# Patient Record
Sex: FEMALE | Race: WHITE | NOT HISPANIC OR LATINO | Employment: FULL TIME | URBAN - METROPOLITAN AREA
[De-identification: names, ages, dates, MRNs, and addresses within clinical notes are randomized per-mention and may not be internally consistent; named-entity substitution may affect disease eponyms.]

---

## 2017-09-25 ENCOUNTER — OFFICE VISIT (OUTPATIENT)
Dept: URGENT CARE | Facility: RETAIL CLINIC | Age: 27
End: 2017-09-25
Payer: COMMERCIAL

## 2017-09-25 VITALS — DIASTOLIC BLOOD PRESSURE: 79 MMHG | SYSTOLIC BLOOD PRESSURE: 130 MMHG | HEART RATE: 80 BPM | TEMPERATURE: 97.7 F

## 2017-09-25 DIAGNOSIS — H10.33 ACUTE BACTERIAL CONJUNCTIVITIS OF BOTH EYES: Primary | ICD-10-CM

## 2017-09-25 PROCEDURE — 99213 OFFICE O/P EST LOW 20 MIN: CPT | Performed by: PHYSICIAN ASSISTANT

## 2017-09-25 RX ORDER — CIPROFLOXACIN HYDROCHLORIDE 3.5 MG/ML
2 SOLUTION/ DROPS TOPICAL 3 TIMES DAILY
Qty: 1 BOTTLE | Refills: 0 | Status: SHIPPED | OUTPATIENT
Start: 2017-09-25 | End: 2017-10-02

## 2017-09-25 RX ORDER — DESOGESTREL AND ETHINYL ESTRADIOL 0.15-0.03
KIT ORAL
Refills: 3 | COMMUNITY
Start: 2017-07-19

## 2017-09-25 NOTE — PATIENT INSTRUCTIONS
Use eye drops as directed for 7 days   Out of activities for at least 24 hrs due to being contagious.   Handwashing     items touched   Throw out mascara  Wear Glasses this week if possible  Toss contacts lens  Sterile lens case  Paper towels at all sinks at home  Wipe discharge away with wet paper towel.   Please follow up with primary care provider if not improving, worsening or new symptoms or for any adverse reactions to medications.

## 2017-09-25 NOTE — PROGRESS NOTES
Chief Complaint   Patient presents with     Eye Problem     1 day; Left first, then right; watery, redness, pain and swelling under both eyes      SUBJECTIVE:  Mónica Lindsey is a 26 year old female who presents complaining of moderate both eyes (L, then R eye) redness, discharge started yesterday  Onset/timing: gradual, worsening.    Associated Signs and Symptoms: cold sxs improving, started last week  No light sensitivity, eye pain, FB sensation.  Treatment measures tried include: none  Contact wearer : Yes     No past medical history on file.  Current Outpatient Prescriptions   Medication Sig Dispense Refill     APRI 0.15-30 MG-MCG per tablet   3        Allergies   Allergen Reactions     Bees      Epipen necessary     Aleve [Naproxen]         History   Smoking Status     Current Every Day Smoker     Packs/day: 0.50     Years: 5.00   Smokeless Tobacco     Never Used       ROS:  CONSTITUTIONAL:NEGATIVE for fever, chills  EYES: POSITIVE for discharge, redness and NEGATIVE for eye pain and photophobia  ENT/MOUTH: POSITIVE for congestion improving NEGATIVE for ear pain    OBJECTIVE:  /79 (BP Location: Left arm)  Pulse 80  Temp 97.7  F (36.5  C) (Oral)  General: no acute distress  Eye exam: PERRL, EOMs intact, fundoscopic exam benign, right eye abnormal findings: conjunctivitis with erythema, discharge and matting noted, left eye abnormal findings: conjunctivitis with erythema, discharge and matting noted.    ASSESSMENT:  (H10.33) Acute bacterial conjunctivitis of both eyes     PLAN:  Plan: ciprofloxacin (CILOXAN) 0.3 % ophthalmic solution  Use eye drops as directed for 7 days   Out of activities for at least 24 hrs due to being contagious.   Handwashing     items touched   Throw out mascara  Wear Glasses this week if possible  Toss contacts lens  Sterile lens case  Paper towels at all sinks at home  Wipe discharge away with wet paper towel.   Please follow up with primary care provider if not improving,  worsening or new symptoms or for any adverse reactions to medications.       Jannette Rosas PA-C  [facility] [place]

## 2017-09-25 NOTE — MR AVS SNAPSHOT
"              After Visit Summary   2017    Mónica Lindsey    MRN: [id number]           Patient Information     Date Of Birth          1990        Visit Information        Provider Department      2017 3:20 PM Jannette Rosas PA-C [facility]        Today's Diagnoses     Acute bacterial conjunctivitis of both eyes    -  1      Care Instructions    Use eye drops as directed for 7 days   Out of activities for at least 24 hrs due to being contagious.   Handwashing     items touched   Throw out mascara  Wear Glasses this week if possible  Toss contacts lens  Sterile lens case  Paper towels at all sinks at home  Wipe discharge away with wet paper towel.   Please follow up with primary care provider if not improving, worsening or new symptoms or for any adverse reactions to medications.             Follow-ups after your visit        Who to contact     You can reach your care team any time of the day by calling [phone].  Notification of test results:  If you have an abnormal lab result, we will notify you by phone as soon as possible.         Additional Information About Your Visit        [employer] Information     [employer] lets you send messages to your doctor, view your test results, renew your prescriptions, schedule appointments and more. To sign up, go to [email or web address] . Click on \"Log in\" on the left side of the screen, which will take you to the Welcome page. Then click on \"Sign up Now\" on the right side of the page.     You will be asked to enter the access code listed below, as well as some personal information. Please follow the directions to create your username and password.     Your access code is: [email or web address]  Expires: 2017  3:55 PM     Your access code will  in 90 days. If you need help or a new code, please call your [place] clinic or [phone].        Care EveryWhere ID     This is your Care EveryWhere ID. This could be used by " other organizations to access your [place] medical records  [id number]        Your Vitals Were     Pulse Temperature                80 97.7  F (36.5  C) (Oral)           Blood Pressure from Last 3 Encounters:   09/25/17 130/79   08/08/16 120/65   01/19/16 118/70    Weight from Last 3 Encounters:   01/19/16 162 lb (73.5 kg)   01/04/16 164 lb (74.4 kg)   02/12/13 149 lb (67.6 kg)              Today, you had the following     No orders found for display         Today's Medication Changes          These changes are accurate as of: 9/25/17  3:55 PM.  If you have any questions, ask your nurse or doctor.               Start taking these medicines.        Dose/Directions    ciprofloxacin 0.3 % ophthalmic solution   Commonly known as:  CILOXAN   Used for:  Acute bacterial conjunctivitis of both eyes        Dose:  2 drop   Place 2 drops into both eyes 3 times daily for 7 days   Quantity:  1 Bottle   Refills:  0            Where to get your medicines      These medications were sent to [facility] [address] [place] [id number]     Phone:  [phone]     ciprofloxacin 0.3 % ophthalmic solution                Primary Care Provider    None Specified       No primary provider on file.        Equal Access to Services     TAMRA MCCLURE AH: Jennifer Marti, luis miguel pierce, qajamesta kaalmada samm, andre jefferson. So [facility] [phone].    ATENCIÓN: Si habla español, tiene a [place] disposición servicios gratuitos de asistencia lingüística. Llame al [phone].    We comply with applicable federal civil rights laws and [place] laws. We do not discriminate on the basis of race, color, national origin, age, disability sex, sexual orientation or gender identity.            Thank you!     Thank you for choosing [facility]  for your care. Our goal is always to provide you with excellent care. Hearing back from our  patients is one way we can continue to improve our services. Please take a few minutes to complete the written survey that you may receive in the mail after your visit with us. Thank you!             Your Updated Medication List - Protect others around you: Learn how to safely use, store and throw away your medicines at www.disposemymeds.org.          This list is accurate as of: 9/25/17  3:55 PM.  Always use your most recent med list.                   Brand Name Dispense Instructions for use Diagnosis    APRI 0.15-30 MG-MCG per tablet   Generic drug:  desogestrel-ethinyl estradiol           ciprofloxacin 0.3 % ophthalmic solution    CILOXAN    1 Bottle    Place 2 drops into both eyes 3 times daily for 7 days    Acute bacterial conjunctivitis of both eyes

## 2017-09-25 NOTE — NURSING NOTE
"Chief Complaint   Patient presents with     Eye Problem     1 day; Left first, then right; watery, redness, pain and swelling under both eyes       Initial /79 (BP Location: Left arm)  Pulse 80  Temp 97.7  F (36.5  C) (Oral) Estimated body mass index is 25.76 kg/(m^2) as calculated from the following:    Height as of 1/4/16: 5' 6.5\" (1.689 m).    Weight as of 1/19/16: 162 lb (73.5 kg).  Medication Reconciliation: complete  "

## 2018-01-07 ENCOUNTER — OFFICE VISIT (OUTPATIENT)
Dept: URGENT CARE | Facility: RETAIL CLINIC | Age: 28
End: 2018-01-07
Payer: COMMERCIAL

## 2018-01-07 VITALS — HEART RATE: 85 BPM | SYSTOLIC BLOOD PRESSURE: 121 MMHG | TEMPERATURE: 98.5 F | DIASTOLIC BLOOD PRESSURE: 81 MMHG

## 2018-01-07 DIAGNOSIS — J02.9 ACUTE PHARYNGITIS, UNSPECIFIED ETIOLOGY: Primary | ICD-10-CM

## 2018-01-07 LAB — S PYO AG THROAT QL IA.RAPID: NEGATIVE

## 2018-01-07 PROCEDURE — 99213 OFFICE O/P EST LOW 20 MIN: CPT | Performed by: PHYSICIAN ASSISTANT

## 2018-01-07 PROCEDURE — 87880 STREP A ASSAY W/OPTIC: CPT | Mod: QW | Performed by: PHYSICIAN ASSISTANT

## 2018-01-07 PROCEDURE — 87081 CULTURE SCREEN ONLY: CPT | Performed by: PHYSICIAN ASSISTANT

## 2018-01-07 NOTE — MR AVS SNAPSHOT
"              After Visit Summary   2018    Mónica Lindsey    MRN: [id number]           Patient Information     Date Of Birth          1990        Visit Information        Provider Department      2018 1:40 PM Jannette Rosas PA-C [facility]        Today's Diagnoses     Acute pharyngitis, unspecified etiology    -  1      Care Instructions    Rapid strep test today is negative.   Your throat culture is pending. [facility] will call you if positive results to start antibiotics at that time.  No phone call if strep culture is negative  Symptomatic treat with fluids, rest, salt water gargles, lozenges, and over the counter pain reliever, such as tylenol as needed.   Please follow up with primary care provider if not improving, worsening or new symptoms           Follow-ups after your visit        Who to contact     You can reach your care team any time of the day by calling [phone].  Notification of test results:  If you have an abnormal lab result, we will notify you by phone as soon as possible.         Additional Information About Your Visit        MyChart Information     [employer] lets you send messages to your doctor, view your test results, renew your prescriptions, schedule appointments and more. To sign up, go to [email or web address] . Click on \"Log in\" on the left side of the screen, which will take you to the Welcome page. Then click on \"Sign up Now\" on the right side of the page.     You will be asked to enter the access code listed below, as well as some personal information. Please follow the directions to create your username and password.     Your access code is: [email or web address]  Expires: 2018  2:35 PM     Your access code will  in 90 days. If you need help or a new code, please call your [place] clinic or [phone].        Care EveryWhere ID     This is your Care EveryWhere ID. This could be used by other organizations to access your [place] " medical records  [id number]        Your Vitals Were     Pulse Temperature                85 98.5  F (36.9  C) (Oral)           Blood Pressure from Last 3 Encounters:   01/07/18 121/81   09/25/17 130/79   08/08/16 120/65    Weight from Last 3 Encounters:   01/19/16 162 lb (73.5 kg)   01/04/16 164 lb (74.4 kg)   02/12/13 149 lb (67.6 kg)              We Performed the Following     BETA STREP GROUP A R/O CULTURE     RAPID STREP SCREEN        Primary Care Provider Fax #    Provider Not In System [phone]                Equal Access to Services     [facility] : Hadii bella Marti, luis miguel pierce, em quijano, andre muñoz . So [facility] [phone].    ATENCIÓN: Si habla español, tiene a [place] disposición servicios gratuitos de asistencia lingüística. Llame al [phone].    We comply with applicable federal civil rights laws and [place] laws. We do not discriminate on the basis of race, color, national origin, age, disability, sex, sexual orientation, or gender identity.            Thank you!     Thank you for choosing [facility]  for your care. Our goal is always to provide you with excellent care. Hearing back from our patients is one way we can continue to improve our services. Please take a few minutes to complete the written survey that you may receive in the mail after your visit with us. Thank you!             Your Updated Medication List - Protect others around you: Learn how to safely use, store and throw away your medicines at www.disposemymeds.org.          This list is accurate as of: 1/7/18  2:35 PM.  Always use your most recent med list.                   Brand Name Dispense Instructions for use Diagnosis    APRI 0.15-30 MG-MCG per tablet   Generic drug:  desogestrel-ethinyl estradiol

## 2018-01-07 NOTE — PROGRESS NOTES
Chief Complaint   Patient presents with     Throat Pain     2 days      SUBJECTIVE:  Mónica Lindsey is a 27 year old female with a chief complaint of sore throat.  Onset of symptoms was 2 days ago.    Course of illness: gradual onset and still present.  Severity moderate  Current and Associated symptoms: sore throat, hard to swallow  Treatment measures tried include Fluids.  Predisposing factors include None.    No past medical history on file.  Current Outpatient Prescriptions   Medication Sig Dispense Refill     APRI 0.15-30 MG-MCG per tablet   3        Allergies   Allergen Reactions     Bees      Epipen necessary     Aleve [Naproxen]      Lips swelled        History   Smoking Status     Current Every Day Smoker     Packs/day: 0.50     Years: 5.00   Smokeless Tobacco     Never Used       ROS:  CONSTITUTIONAL:NEGATIVE for fever, chills  ENT/MOUTH: POSITIVE for sore throat and swollen glands and NEGATIVE for ear pain bilateral and nasal congestion  RESP:NEGATIVE for significant cough or wheezing    OBJECTIVE:   /81 (BP Location: Left arm)  Pulse 85  Temp 98.5  F (36.9  C) (Oral)  GENERAL APPEARANCE: healthy, alert and no distress  EYES: conjunctiva clear  HENT: ear canals and TM's normal.  Nose normal.  Pharynx erythematous with no exudate noted.  NECK: supple, non-tender to palpation, small adenopathy noted  RESP: lungs clear to auscultation - no rales, rhonchi or wheezes  CV: regular rates and rhythm, normal S1 S2, no murmur noted  SKIN: no suspicious lesions or rashes    Rapid Strep test is negative; await throat culture results.    ASSESSMENT:  Acute pharyngitis, unspecified etiology    PLAN:   Rapid strep test today is negative.   Your throat culture is pending. [facility] will call you if positive results to start antibiotics at that time.  No phone call if strep culture is negative  Symptomatic treat with fluids, rest, salt water gargles, lozenges, and over the counter pain reliever, such as tylenol as  needed.   Please follow up with primary care provider if not improving, worsening or new symptoms     Jannette Rosas PA-C  [facility] [place]

## 2018-01-07 NOTE — NURSING NOTE
"Chief Complaint   Patient presents with     Throat Pain     2 days       Initial /81 (BP Location: Left arm)  Pulse 85  Temp 98.5  F (36.9  C) (Oral) Estimated body mass index is 25.76 kg/(m^2) as calculated from the following:    Height as of 1/4/16: 5' 6.5\" (1.689 m).    Weight as of 1/19/16: 162 lb (73.5 kg).  Medication Reconciliation: complete  "

## 2018-01-07 NOTE — PATIENT INSTRUCTIONS
Rapid strep test today is negative.   Your throat culture is pending. [facility] will call you if positive results to start antibiotics at that time.  No phone call if strep culture is negative  Symptomatic treat with fluids, rest, salt water gargles, lozenges, and over the counter pain reliever, such as tylenol as needed.   Please follow up with primary care provider if not improving, worsening or new symptoms

## 2018-01-09 LAB — BETA STREP CONFIRM: NORMAL

## 2020-03-02 ENCOUNTER — HEALTH MAINTENANCE LETTER (OUTPATIENT)
Age: 30
End: 2020-03-02

## 2020-12-14 ENCOUNTER — HEALTH MAINTENANCE LETTER (OUTPATIENT)
Age: 30
End: 2020-12-14

## 2021-04-18 ENCOUNTER — HEALTH MAINTENANCE LETTER (OUTPATIENT)
Age: 31
End: 2021-04-18

## 2021-10-02 ENCOUNTER — HEALTH MAINTENANCE LETTER (OUTPATIENT)
Age: 31
End: 2021-10-02

## 2022-05-14 ENCOUNTER — HEALTH MAINTENANCE LETTER (OUTPATIENT)
Age: 32
End: 2022-05-14

## 2022-09-04 ENCOUNTER — HEALTH MAINTENANCE LETTER (OUTPATIENT)
Age: 32
End: 2022-09-04

## 2023-06-03 ENCOUNTER — HEALTH MAINTENANCE LETTER (OUTPATIENT)
Age: 33
End: 2023-06-03

## 2024-04-03 DIAGNOSIS — Z13.9 SCREENING FOR CONDITION: Primary | ICD-10-CM

## 2024-04-03 NOTE — PROGRESS NOTES
Mónica's infant is in the NICU being evaluated for thrombocytopenia of unknown origin. This encounter was created to provide orders for parental testing to further investigate her daughter's thrombocytopenia.    Rosy Solomon MD

## 2024-04-04 ENCOUNTER — APPOINTMENT (OUTPATIENT)
Dept: LAB | Facility: CLINIC | Age: 34
End: 2024-04-04

## 2024-04-04 PROCEDURE — 36415 COLL VENOUS BLD VENIPUNCTURE: CPT | Performed by: PEDIATRICS

## 2024-04-04 PROCEDURE — 81111 HPA-9 GENOTYPING: CPT | Performed by: PEDIATRICS

## 2024-04-04 PROCEDURE — 81110 HPA-6 GENOTYPING: CPT | Performed by: PEDIATRICS

## 2024-04-04 PROCEDURE — 81109 HPA-5 GENOTYPING: CPT | Performed by: PEDIATRICS

## 2024-04-04 PROCEDURE — 81112 HPA-15 GENOTYPING: CPT | Performed by: PEDIATRICS

## 2024-04-04 PROCEDURE — 86022 PLATELET ANTIBODIES: CPT | Performed by: PEDIATRICS

## 2024-04-04 PROCEDURE — 81108 HPA-4 GENOTYPING: CPT | Performed by: PEDIATRICS

## 2024-04-04 PROCEDURE — 81105 HPA-1 GENOTYPING: CPT | Performed by: PEDIATRICS

## 2024-04-04 PROCEDURE — 81106 HPA-2 GENOTYPING: CPT | Performed by: PEDIATRICS

## 2024-04-11 LAB — SCANNED LAB RESULT: NORMAL

## 2024-04-27 ENCOUNTER — HEALTH MAINTENANCE LETTER (OUTPATIENT)
Age: 34
End: 2024-04-27

## 2024-05-04 ENCOUNTER — APPOINTMENT (OUTPATIENT)
Dept: ULTRASOUND IMAGING | Facility: CLINIC | Age: 34
End: 2024-05-04
Attending: EMERGENCY MEDICINE
Payer: COMMERCIAL

## 2024-05-04 ENCOUNTER — APPOINTMENT (OUTPATIENT)
Dept: CT IMAGING | Facility: CLINIC | Age: 34
End: 2024-05-04
Attending: EMERGENCY MEDICINE
Payer: COMMERCIAL

## 2024-05-04 ENCOUNTER — HOSPITAL ENCOUNTER (EMERGENCY)
Facility: CLINIC | Age: 34
Discharge: HOME | End: 2024-05-04
Attending: EMERGENCY MEDICINE | Admitting: EMERGENCY MEDICINE
Payer: COMMERCIAL

## 2024-05-04 VITALS
HEART RATE: 71 BPM | TEMPERATURE: 97.5 F | WEIGHT: 217 LBS | DIASTOLIC BLOOD PRESSURE: 80 MMHG | BODY MASS INDEX: 36.15 KG/M2 | OXYGEN SATURATION: 98 % | HEIGHT: 65 IN | SYSTOLIC BLOOD PRESSURE: 126 MMHG | RESPIRATION RATE: 20 BRPM

## 2024-05-04 DIAGNOSIS — T14.8XXA MUSCLE TEAR: ICD-10-CM

## 2024-05-04 PROCEDURE — 258N000003 HC RX IP 258 OP 636: Performed by: EMERGENCY MEDICINE

## 2024-05-04 PROCEDURE — 76705 ECHO EXAM OF ABDOMEN: CPT

## 2024-05-04 PROCEDURE — 250N000009 HC RX 250: Performed by: EMERGENCY MEDICINE

## 2024-05-04 PROCEDURE — 250N000011 HC RX IP 250 OP 636: Performed by: EMERGENCY MEDICINE

## 2024-05-04 PROCEDURE — 96374 THER/PROPH/DIAG INJ IV PUSH: CPT | Mod: 59 | Performed by: EMERGENCY MEDICINE

## 2024-05-04 PROCEDURE — 74177 CT ABD & PELVIS W/CONTRAST: CPT

## 2024-05-04 PROCEDURE — 99285 EMERGENCY DEPT VISIT HI MDM: CPT | Mod: 25 | Performed by: EMERGENCY MEDICINE

## 2024-05-04 PROCEDURE — 99284 EMERGENCY DEPT VISIT MOD MDM: CPT | Performed by: EMERGENCY MEDICINE

## 2024-05-04 PROCEDURE — 96361 HYDRATE IV INFUSION ADD-ON: CPT | Performed by: EMERGENCY MEDICINE

## 2024-05-04 RX ORDER — KETOROLAC TROMETHAMINE 30 MG/ML
30 INJECTION, SOLUTION INTRAMUSCULAR; INTRAVENOUS ONCE
Status: COMPLETED | OUTPATIENT
Start: 2024-05-04 | End: 2024-05-04

## 2024-05-04 RX ORDER — IOPAMIDOL 755 MG/ML
500 INJECTION, SOLUTION INTRAVASCULAR ONCE
Status: COMPLETED | OUTPATIENT
Start: 2024-05-04 | End: 2024-05-04

## 2024-05-04 RX ADMIN — SODIUM CHLORIDE 60 ML: 9 INJECTION, SOLUTION INTRAVENOUS at 10:45

## 2024-05-04 RX ADMIN — SODIUM CHLORIDE 1000 ML: 9 INJECTION, SOLUTION INTRAVENOUS at 10:54

## 2024-05-04 RX ADMIN — KETOROLAC TROMETHAMINE 30 MG: 30 INJECTION, SOLUTION INTRAMUSCULAR at 10:55

## 2024-05-04 RX ADMIN — IOPAMIDOL 100 ML: 755 INJECTION, SOLUTION INTRAVENOUS at 10:44

## 2024-05-04 ASSESSMENT — ACTIVITIES OF DAILY LIVING (ADL)
ADLS_ACUITY_SCORE: 35
ADLS_ACUITY_SCORE: 35

## 2024-05-04 ASSESSMENT — COLUMBIA-SUICIDE SEVERITY RATING SCALE - C-SSRS
2. HAVE YOU ACTUALLY HAD ANY THOUGHTS OF KILLING YOURSELF IN THE PAST MONTH?: NO
1. IN THE PAST MONTH, HAVE YOU WISHED YOU WERE DEAD OR WISHED YOU COULD GO TO SLEEP AND NOT WAKE UP?: NO
6. HAVE YOU EVER DONE ANYTHING, STARTED TO DO ANYTHING, OR PREPARED TO DO ANYTHING TO END YOUR LIFE?: NO

## 2024-05-04 NOTE — DISCHARGE INSTRUCTIONS
Continue ice or heat over painful area.    Can try over-the-counter lidocaine patch or lidocaine gel.    Okay to take ibuprofen or Tylenol for pain.    Follow-up with your primary care and/or your OB/GYN.    It may be a good idea to get an abdominal binder/shapewear.    Return at anytime for concerns.    I hope you heal quickly!!!

## 2024-05-04 NOTE — ED TRIAGE NOTES
Patient had  3/27 and says she coughed/sneezed yesterday and felt a pop near her incision and LLQ.

## 2024-05-04 NOTE — ED PROVIDER NOTES
"  History     Chief Complaint   Patient presents with    Abdominal Pain    Post-op Problem     HPI  History per patient, medical records    This is a 33-year-old female presenting with abdominal pain.  Patient had a  on 3/27/2024.  She has been doing well but yesterday sneezed and noted a sharp pain/pop in the left lower quadrant.  The area was uncomfortable and tender but she was managing until a little bit later when the pain severely increased to a \"15/10\".  Pain is worse with movements, especially anything where she needs to flex her abdominal muscles including change in position, standing/sitting.  She took ibuprofen at about 6:30 in the morning.  She is not on blood thinners.  She has not had any significant area of abnormality at or around her  site.  No fevers or chills.  Urinating normally.  Normal bowel movements.    Allergies:  Allergies   Allergen Reactions    Bees      Epipen necessary    Aleve [Naproxen]      Lips swelled       Problem List:    Patient Active Problem List    Diagnosis Date Noted    CARDIOVASCULAR SCREENING; LDL GOAL LESS THAN 160 2013     Priority: Medium        Past Medical History:    No past medical history on file.    Past Surgical History:    No past surgical history on file.    Family History:    No family history on file.    Social History:  Marital Status:  Single [1]  Social History     Tobacco Use    Smoking status: Every Day     Current packs/day: 0.50     Average packs/day: 0.5 packs/day for 5.0 years (2.5 ttl pk-yrs)     Types: Cigarettes    Smokeless tobacco: Never   Substance Use Topics    Alcohol use: Yes     Comment: every few days, 1-2 drinks    Drug use: No        Medications:    APRI 0.15-30 MG-MCG per tablet          Review of Systems  All other ROS reviewed and are negative or non-contributory except as stated in HPI.     Physical Exam   BP: 125/78  Pulse: 78  Temp: 97.5  F (36.4  C)  Resp: 20  Height: 165.1 cm (5' 5\")  Weight: 98.4 kg (217 " lb)  SpO2: 96 %      Physical Exam  Vitals and nursing note reviewed.   Constitutional:       Comments: Very pleasant, healthy-appearing young female sitting in the bed   HENT:      Head: Normocephalic.   Eyes:      Extraocular Movements: Extraocular movements intact.   Cardiovascular:      Rate and Rhythm: Normal rate.      Pulses: Normal pulses.   Pulmonary:      Effort: Pulmonary effort is normal.   Abdominal:      Palpations: Abdomen is soft.      Comments: Well-healed  site.  She has tenderness to palpation superior to the left lateral portion of the site without obvious mass or rebound or external abnormalities.  Pain increases with attempts to sit forward, stand   Musculoskeletal:         General: Normal range of motion.      Cervical back: Normal range of motion.   Skin:     General: Skin is warm and dry.      Findings: No rash.   Neurological:      General: No focal deficit present.      Mental Status: She is alert.   Psychiatric:         Mood and Affect: Mood normal.         Behavior: Behavior normal.         ED Course (with Medical Decision Making)    Pt seen and examined by me.  RN and EPIC notes reviewed.       Patient with left lower quadrant pain around the  site after she coughs/sneezes.  I suspect she likely tore some musculature.  There is nothing externally.  She notes pain is very severe.    I had ultrasound available and they were able to do a soft tissue ultrasound.  Initial reports were that there was nothing abnormal found so I did order a CT scan.  While patient was in CT scan ultrasound results officially came back.  Apparently there is a small ill-defined area of hypoechogenicity within the left anterior abdominal wall musculature measuring 1.4 x 1.3 cm consistent with likely a small intramuscular hematoma.    CT scan showed slight enlargement of the lower left rectus abdominis muscle in comparison to the right that may indicate a very small intramuscular  hematoma.    Results discussed with the patient.  She was reassured.  I recommend rest, ice or heat over the painful area.  Okay to continue ibuprofen or Aleve.  Add Tylenol as needed.  She has not oxycodone at home to use.  We talked about some abdominal binder or shape where or holding her hand over the area with movements or cough or sneeze.  Follow-up with her primary care or OB/GYN.  Return for significant worsening, changes or concerns.        Procedures    Results for orders placed or performed during the hospital encounter of 24 (from the past 24 hour(s))   US Soft Tissue Abdominal Wall or Lower Back    Narrative    EXAM: US SOFT TISSUE ABDOMINAL WALL OR LOWER BACK  LOCATION: [facility]  DATE: 2024    INDICATION: Left lower quadrant pain, status post C section 3 27 2024.  Pain started after cough sneeze.  COMPARISON: None.  TECHNIQUE: Routine.    FINDINGS: Ultrasound examination of the area of pain within the left lower quadrant demonstrates a small, ill-defined area of hypoechogenicity within the left anterior abdominal wall musculature measuring approximately 1.4 x 1.3 cm. This may represent a   small intramuscular hematoma. No definite fluid component to suggest an abscess.       CT ABDOMEN PELVIS W CONTRAST    Narrative    EXAM: CT ABDOMEN AND PELVIS WITH CONTRAST  LOCATION: [facility]  DATE: 2024    INDICATION: Status post  2024. Severe left lower quadrant pain started after sneezing yesterday.  COMPARISON: None.  TECHNIQUE: CT scan of the abdomen and pelvis was performed following injection of IV contrast. Multiplanar reformats were obtained. Dose reduction techniques were used.  CONTRAST: 100 mL Isovue 370.    FINDINGS:   LOWER CHEST: Normal.    HEPATOBILIARY: Normal.    PANCREAS: Normal.    SPLEEN: Normal.    ADRENAL GLANDS: Normal.    KIDNEYS/BLADDER: Small left renal cyst. No hydronephrosis.    BOWEL:  Normal.    LYMPH NODES: Normal.    VASCULATURE: Normal.    PELVIC ORGANS: Normal appearing uterus and adnexa. There is some scarring in the anterior abdominal wall from recent  section. Slight enlargement of the left rectus abdominis muscle in comparison to the right.    MUSCULOSKELETAL: Normal.      Impression    IMPRESSION:   1.  Slight enlargement of the lower left rectus abdominis muscle in comparison to the right may indicate a very small intramuscular hematoma or asymmetric scarring from prior  section.  2.  No other findings to explain the patient's pain. No hernia or fluid collection.           Medications   ketorolac (TORADOL) injection 30 mg (30 mg Intravenous $Given 24 1055)   sodium chloride 0.9% BOLUS 1,000 mL (0 mLs Intravenous Stopped 24 1151)   Saline 100mL Bag (60 mLs Intravenous $Given 24 1045)   iopamidol (ISOVUE-370) solution 500 mL (100 mLs Intravenous $Given 24 1044)       Assessments & Plan    I have reviewed the findings, diagnosis, plan and need for follow up with the patient.    Discharge Medication List as of 2024 11:51 AM          Final diagnoses:   Muscle tear - rectus abdominus     Disposition: Patient discharged home in stable condition.  Plan as above.  Return for concerns.     Note: Chart documentation done in part with Dragon Voice Recognition software. Although reviewed after completion, some word and grammatical errors may remain.     2024   [facility] EMERGENCY DEPT       Linda Knowles MD  [phone]

## 2025-05-11 ENCOUNTER — HEALTH MAINTENANCE LETTER (OUTPATIENT)
Age: 35
End: 2025-05-11